# Patient Record
(demographics unavailable — no encounter records)

---

## 2025-01-22 NOTE — PHYSICAL EXAM

## 2025-01-22 NOTE — HISTORY OF PRESENT ILLNESS
[FreeTextEntry1] : 78 year old man with HLD and abnormal EKG.  Recently has noted new vague substernal chest discomfort.  LDL 62 on atorvastatin 20 mg. Coronary CTA 2022 showed a moderate mid LAD stenosis.

## 2025-01-22 NOTE — DISCUSSION/SUMMARY
[FreeTextEntry1] : 78 year old man with HLD, abnormal EKG and new onset substernal chest discomfort.  Previous moderate LAD disease on CTA 3 years ago.  Repeat CTA ordered. [EKG obtained to assist in diagnosis and management of assessed problem(s)] : EKG obtained to assist in diagnosis and management of assessed problem(s)

## 2025-03-25 NOTE — PHYSICAL EXAM

## 2025-03-25 NOTE — HISTORY OF PRESENT ILLNESS
[FreeTextEntry1] : 78 year old man with mild CAD on recent coronary CTA come in with atypical symptoms.  he notes and occasional empty feeling in his chest lasting a few seconds after coming up stairs. In February, coroarnary  CTA was minimal disease with normal FFR.

## 2025-03-25 NOTE — DISCUSSION/SUMMARY
[FreeTextEntry1] : Patient with minimal CAD and nl FFR on coronary CTA.Dyspnea and atypical chest pain.  Echocardiogram ordered. [EKG obtained to assist in diagnosis and management of assessed problem(s)] : EKG obtained to assist in diagnosis and management of assessed problem(s)

## 2025-05-23 NOTE — PHYSICAL EXAM
[de-identified] : Constitutional: The general appearance of the patient is well developed, well nourished, no deformities and well groomed. Normal   Gait: Gait and function is as follows: normal gait.   Skin: Head and neck visualized skin is normal. Left upper extremity visualized skin is normal. Right upper extremity visualized skin is normal. Thoracic Skin of the thoracic spine shows visualized skin is normal.   Cardiovascular: palpable radial pulse bilaterally, good capillary refill in digits of the bilateral upper extremities and no temperature or color changes in the bilateral upper extremities.   Lymphatic: Normal Palpation of lymph nodes in the cervical.   Neurologic: fine motor control in the bilateral upper extremities is intact. Deep Tendon Reflexes in Upper and Lower Extremities Negative Lezama's in the bilateral upper extremities. The patient is oriented to time, place and person. Sensation to light touch intact in the bilateral upper extremities. Mood and Affect is normal.   Right Shoulder: Inspection of the shoulder/upper arm is as follows: no scapula winging, no biceps deformity and no AC joint deformity. Palpation of the shoulder/upper arm is as follows: There is tenderness at the proximal biceps tendon. Range of motion of the shoulder is as follows: Pain with internal rotation, external rotation, abduction and forward flexion. Strength of the shoulder is as follows: Supraspinatus 4/5. External Rotation 4/5. Internal Rotation 4/5. Deltoid 5/5 Ligament Stability and Special Tests of the shoulder is as follows: Neer test is positive. Torres' test is positive. Speed's test is positive   Left Shoulder: Inspection of the shoulder/upper arm is as follows: There is mild pain with range of motion of the shoulder range of motion of the shoulder    Neck:   Inspection / Palpation of the cervical spine is as follows: mild paracervical tenderness. Range of motion of the cervical spine is as follows: moderately decreased range of motion of the cervical spine. Stability testing for the cervical spine is as follows Stable range of motion.   Back, including spine: Inspection / Palpation of the thoracic/lumbar spine is as follows: There is a full, pain free, stable range of motion of the thoracic spine with a normal tone and not tenderness to palpation..

## 2025-05-23 NOTE — HISTORY OF PRESENT ILLNESS
[de-identified] : This is a 78yo male presenting to the office c/o ongoing right shoulder pain x 25 years.  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 Pt reports when he lets his shoulder hang, he gets pain in his shoulder.  Pain is described as constant, severe in quality. Currently pain is 5/10 and non-radiating. Patient reports pain has been getting progressively worse. Pain is worse at night. Overall pain does improve with rest and ice. Patient denies any numbness or tingling.

## 2025-05-23 NOTE — DISCUSSION/SUMMARY
[de-identified] : This is a 80yo male presenting to the office c/o ongoing right shoulder pain x 25 years.   Outside x-rays reviewed demonstrates bone on bone arthritis  Patient would like to avoid surgery and continue conservative treatment  For arthritis  Tylenol 3x 500mg Joint supplements discussed  Advised patient should contact his PCP or Rheumatologist for medical management  For Patient's underlying bursitis Patient was treated with a subacromial injection for diagnostic and therapeutic purposes Discussed viscosupplementation but advised it may not be effective due to lack of joint space Follow up as needed    We discussed that if patient is not improving with anti-inflammatories and activity modification, he would be a candidate for reverse total shoulder replacement and biceps tenodesis. We discussed the perioperative restrictions and recovery time for the surgery.        Attestation: I, Mary LOCKETT'Lola , attest that this documentation has been prepared under the direction and in the presence of Provider Azael Blount MD. The documentation recorded by the scribe, in my presence, accurately reflects the service I personally performed, and the decisions made by me with my edits as appropriate. Azael Blount MD

## 2025-05-23 NOTE — PROCEDURE
[FreeTextEntry3] : Large Joint Injection was performed because of pain and inflammation.   Anesthesia: ethyl chloride sprayed topically..   Kenalog: An injection of kenalog 40 mg , 1 cc.   Lidocaine: 7 cc.   Medication was injected in the right subacromial space. Patient has tried OTC's including aspirin, Ibuprofen, Aleve etc or prescription NSAIDS, and/or exercises at home and/ or physical therapy without satisfactory response and Patient has decreased mobility in the joint. After verbal consent using sterile preparation and technique. The risks, benefits, and alternatives to cortisone injection were explained in full to the patient. Risks outlined include but are not limited to infection, sepsis, bleeding, scarring, skin discoloration, temporary increase in pain, syncopal episode, failure to resolve symptoms, allergic reaction, symptom recurrence, and elevation of blood sugar in diabetics. Patient understood the risks. All questions were answered. After discussion of options, patient requested an injection. Oral informed consent was obtained and sterile prep was done of the injection site. Sterile technique was utilized for the procedure including the preparation of the solutions used for the injection. Patient tolerated the procedure well. Advised to ice the injection site this evening. Prep with alcohol locally to site. Sterile technique used. Patient tolerated procedure well. Post Procedure Instructions: Patient was advised to call if redness, pain, or fever occur and apply ice for 15 min. out of every hour for the next 12-24 hours as tolerated. patient was advised to rest the joint(s) for 7 days.